# Patient Record
Sex: FEMALE | Race: WHITE | NOT HISPANIC OR LATINO | URBAN - METROPOLITAN AREA
[De-identification: names, ages, dates, MRNs, and addresses within clinical notes are randomized per-mention and may not be internally consistent; named-entity substitution may affect disease eponyms.]

---

## 2017-01-12 ENCOUNTER — IMPORTED ENCOUNTER (OUTPATIENT)
Dept: URBAN - METROPOLITAN AREA CLINIC 50 | Facility: CLINIC | Age: 82
End: 2017-01-12

## 2017-07-11 ENCOUNTER — IMPORTED ENCOUNTER (OUTPATIENT)
Dept: URBAN - METROPOLITAN AREA CLINIC 50 | Facility: CLINIC | Age: 82
End: 2017-07-11

## 2017-07-13 ENCOUNTER — IMPORTED ENCOUNTER (OUTPATIENT)
Dept: URBAN - METROPOLITAN AREA CLINIC 50 | Facility: CLINIC | Age: 82
End: 2017-07-13

## 2017-08-21 ENCOUNTER — IMPORTED ENCOUNTER (OUTPATIENT)
Dept: URBAN - METROPOLITAN AREA CLINIC 50 | Facility: CLINIC | Age: 82
End: 2017-08-21

## 2017-10-02 ENCOUNTER — IMPORTED ENCOUNTER (OUTPATIENT)
Dept: URBAN - METROPOLITAN AREA CLINIC 50 | Facility: CLINIC | Age: 82
End: 2017-10-02

## 2017-10-03 ENCOUNTER — IMPORTED ENCOUNTER (OUTPATIENT)
Dept: URBAN - METROPOLITAN AREA CLINIC 50 | Facility: CLINIC | Age: 82
End: 2017-10-03

## 2017-10-24 ENCOUNTER — IMPORTED ENCOUNTER (OUTPATIENT)
Dept: URBAN - METROPOLITAN AREA CLINIC 50 | Facility: CLINIC | Age: 82
End: 2017-10-24

## 2017-12-05 ENCOUNTER — IMPORTED ENCOUNTER (OUTPATIENT)
Dept: URBAN - METROPOLITAN AREA CLINIC 50 | Facility: CLINIC | Age: 82
End: 2017-12-05

## 2018-05-29 ENCOUNTER — IMPORTED ENCOUNTER (OUTPATIENT)
Dept: URBAN - METROPOLITAN AREA CLINIC 50 | Facility: CLINIC | Age: 83
End: 2018-05-29

## 2018-07-30 ENCOUNTER — IMPORTED ENCOUNTER (OUTPATIENT)
Dept: URBAN - METROPOLITAN AREA CLINIC 50 | Facility: CLINIC | Age: 83
End: 2018-07-30

## 2019-01-28 ENCOUNTER — IMPORTED ENCOUNTER (OUTPATIENT)
Dept: URBAN - METROPOLITAN AREA CLINIC 50 | Facility: CLINIC | Age: 84
End: 2019-01-28

## 2019-07-29 ENCOUNTER — IMPORTED ENCOUNTER (OUTPATIENT)
Dept: URBAN - METROPOLITAN AREA CLINIC 50 | Facility: CLINIC | Age: 84
End: 2019-07-29

## 2019-09-23 ENCOUNTER — IMPORTED ENCOUNTER (OUTPATIENT)
Dept: URBAN - METROPOLITAN AREA CLINIC 50 | Facility: CLINIC | Age: 84
End: 2019-09-23

## 2019-10-23 ENCOUNTER — IMPORTED ENCOUNTER (OUTPATIENT)
Dept: URBAN - METROPOLITAN AREA CLINIC 50 | Facility: CLINIC | Age: 84
End: 2019-10-23

## 2019-10-24 ENCOUNTER — IMPORTED ENCOUNTER (OUTPATIENT)
Dept: URBAN - METROPOLITAN AREA CLINIC 50 | Facility: CLINIC | Age: 84
End: 2019-10-24

## 2020-02-27 ENCOUNTER — IMPORTED ENCOUNTER (OUTPATIENT)
Dept: URBAN - METROPOLITAN AREA CLINIC 50 | Facility: CLINIC | Age: 85
End: 2020-02-27

## 2020-02-28 ENCOUNTER — IMPORTED ENCOUNTER (OUTPATIENT)
Dept: URBAN - METROPOLITAN AREA CLINIC 50 | Facility: CLINIC | Age: 85
End: 2020-02-28

## 2020-06-12 ENCOUNTER — IMPORTED ENCOUNTER (OUTPATIENT)
Dept: URBAN - METROPOLITAN AREA CLINIC 50 | Facility: CLINIC | Age: 85
End: 2020-06-12

## 2020-08-27 ENCOUNTER — IMPORTED ENCOUNTER (OUTPATIENT)
Dept: URBAN - METROPOLITAN AREA CLINIC 50 | Facility: CLINIC | Age: 85
End: 2020-08-27

## 2020-08-27 NOTE — PATIENT DISCUSSION
"""IOP stable OU. Testing reviewed with patient today."" ""Continue Travatan Z both eyes at bedtime . "" ""OCT RNFL: OD: Abnormal

## 2021-02-11 ENCOUNTER — IMPORTED ENCOUNTER (OUTPATIENT)
Dept: URBAN - METROPOLITAN AREA CLINIC 50 | Facility: CLINIC | Age: 86
End: 2021-02-11

## 2021-04-17 ASSESSMENT — VISUAL ACUITY
OD_CC: J1+
OD_SC: 20/20
OD_SC: 20/25+2
OS_PH: 20/30
OS_SC: 20/30-1
OD_SC: 20/20-2
OS_SC: 20/25-
OD_SC: 20/20
OS_CC: 20/25
OS_SC: 20/25-1
OS_SC: 20/30-2
OS_CC: J1+@ 18 IN
OD_SC: 20/25-1
OS_SC: 20/30+2
OD_SC: 20/25
OD_SC: 20/20-1
OS_SC: 20/25-
OD_CC: 20/20
OS_CC: 20/15
OD_SC: 20/25-2
OD_SC: 20/30+2
OS_CC: J1+
OS_SC: 20/30-2
OS_SC: 20/30+1
OD_CC: 20/25
OS_SC: 20/30=
OD_SC: 20/25
OD_CC: J1
OD_SC: 20/25-
OS_SC: 20/25-2
OD_SC: 20/25=
OS_CC: 20/30
OS_SC: 20/30
OS_PH: 20/25-2
OS_CC: J1
OS_SC: 20/25-2
OD_CC: J1+@ 18 IN

## 2021-04-17 ASSESSMENT — TONOMETRY
OS_IOP_MMHG: 15
OS_IOP_MMHG: 19
OS_IOP_MMHG: 21
OD_IOP_MMHG: 17
OS_IOP_MMHG: 17
OS_IOP_MMHG: 17
OS_IOP_MMHG: 14
OD_IOP_MMHG: 16
OS_IOP_MMHG: 20
OD_IOP_MMHG: 16
OD_IOP_MMHG: 15
OS_IOP_MMHG: 15
OS_IOP_MMHG: 18
OS_IOP_MMHG: 16
OD_IOP_MMHG: 16
OD_IOP_MMHG: 20
OS_IOP_MMHG: 14
OD_IOP_MMHG: 16
OD_IOP_MMHG: 15
OS_IOP_MMHG: 21
OD_IOP_MMHG: 18
OS_IOP_MMHG: 20
OS_IOP_MMHG: 20
OD_IOP_MMHG: 16
OS_IOP_MMHG: 18
OS_IOP_MMHG: 19
OD_IOP_MMHG: 17
OS_IOP_MMHG: 18
OD_IOP_MMHG: 20
OS_IOP_MMHG: 18
OD_IOP_MMHG: 18
OS_IOP_MMHG: 17
OS_IOP_MMHG: 17
OD_IOP_MMHG: 17
OD_IOP_MMHG: 21
OS_IOP_MMHG: 18
OD_IOP_MMHG: 19
OD_IOP_MMHG: 14
OS_IOP_MMHG: 21
OD_IOP_MMHG: 19
OS_IOP_MMHG: 15
OS_IOP_MMHG: 18
OD_IOP_MMHG: 21
OS_IOP_MMHG: 16
OS_IOP_MMHG: 17
OD_IOP_MMHG: 18
OD_IOP_MMHG: 19

## 2021-04-17 ASSESSMENT — PACHYMETRY
OS_CT_UM: 605
OD_CT_UM: 586
OS_CT_UM: 605
OD_CT_UM: 586
OS_CT_UM: 605
OD_CT_UM: 586
OS_CT_UM: 605
OD_CT_UM: 586
OS_CT_UM: 605
OD_CT_UM: 586
OS_CT_UM: 605
OD_CT_UM: 586
OD_CT_UM: 586
OS_CT_UM: 605
OD_CT_UM: 586
OS_CT_UM: 605
OD_CT_UM: 586
OS_CT_UM: 605
OS_CT_UM: 605
OD_CT_UM: 586
OD_CT_UM: 586
OS_CT_UM: 605

## 2021-08-18 ENCOUNTER — PREPPED CHART (OUTPATIENT)
Dept: URBAN - METROPOLITAN AREA CLINIC 53 | Facility: CLINIC | Age: 86
End: 2021-08-18

## 2021-10-27 ENCOUNTER — PROBLEM (OUTPATIENT)
Dept: URBAN - METROPOLITAN AREA CLINIC 50 | Facility: CLINIC | Age: 86
End: 2021-10-27

## 2021-10-27 DIAGNOSIS — H01.021: ICD-10-CM

## 2021-10-27 DIAGNOSIS — H01.024: ICD-10-CM

## 2021-10-27 PROCEDURE — 92012 INTRM OPH EXAM EST PATIENT: CPT

## 2021-10-27 RX ORDER — OLOPATADINE HYDROCHLORIDE 2 MG/ML: 1 SOLUTION OPHTHALMIC

## 2021-10-27 RX ORDER — LOTEPREDNOL ETABONATE 5 MG/ML: 1 SUSPENSION/ DROPS OPHTHALMIC TWICE A DAY

## 2021-10-27 ASSESSMENT — VISUAL ACUITY
OS_PH: 20/25-1
OS_SC: 20/40+1
OD_SC: 20/20-1

## 2021-10-27 ASSESSMENT — TONOMETRY
OS_IOP_MMHG: 13
OD_IOP_MMHG: 14
OD_IOP_MMHG: 16
OS_IOP_MMHG: 16

## 2021-11-04 ENCOUNTER — DIAGNOSTICS - HVF/OCT (OUTPATIENT)
Dept: URBAN - METROPOLITAN AREA CLINIC 53 | Facility: CLINIC | Age: 86
End: 2021-11-04

## 2021-11-04 DIAGNOSIS — H40.1131: ICD-10-CM

## 2021-11-04 PROCEDURE — 92133 CPTRZD OPH DX IMG PST SGM ON: CPT

## 2021-11-11 ENCOUNTER — 8 MONTH FOLLOW-UP (OUTPATIENT)
Dept: URBAN - METROPOLITAN AREA CLINIC 53 | Facility: CLINIC | Age: 86
End: 2021-11-11

## 2021-11-11 DIAGNOSIS — H16.223: ICD-10-CM

## 2021-11-11 DIAGNOSIS — H01.024: ICD-10-CM

## 2021-11-11 DIAGNOSIS — H01.021: ICD-10-CM

## 2021-11-11 DIAGNOSIS — H40.1131: ICD-10-CM

## 2021-11-11 PROCEDURE — 92012 INTRM OPH EXAM EST PATIENT: CPT

## 2021-11-11 RX ORDER — LOTEPREDNOL ETABONATE 5 MG/ML
1 SUSPENSION/ DROPS OPHTHALMIC TWICE A DAY
End: 2021-11-11

## 2021-11-11 ASSESSMENT — TONOMETRY
OD_IOP_MMHG: 13
OD_IOP_MMHG: 11
OS_IOP_MMHG: 10
OS_IOP_MMHG: 13

## 2021-11-11 ASSESSMENT — VISUAL ACUITY
OD_SC: 20/20-1
OS_SC: 20/40
OS_PH: 20/25

## 2022-04-20 ENCOUNTER — DIAGNOSTICS ONLY (OUTPATIENT)
Dept: URBAN - METROPOLITAN AREA CLINIC 52 | Facility: CLINIC | Age: 87
End: 2022-04-20

## 2022-04-20 DIAGNOSIS — H47.233: ICD-10-CM

## 2022-04-20 PROCEDURE — 92273 FULL FIELD ERG W/I&R: CPT

## 2022-04-21 ENCOUNTER — ESTABLISHED PATIENT (OUTPATIENT)
Dept: URBAN - METROPOLITAN AREA CLINIC 53 | Facility: CLINIC | Age: 87
End: 2022-04-21

## 2022-04-21 DIAGNOSIS — H47.233: ICD-10-CM

## 2022-04-21 DIAGNOSIS — H16.223: ICD-10-CM

## 2022-04-21 DIAGNOSIS — E11.9: ICD-10-CM

## 2022-04-21 DIAGNOSIS — H40.1131: ICD-10-CM

## 2022-04-21 PROCEDURE — 92083 EXTENDED VISUAL FIELD XM: CPT

## 2022-04-21 PROCEDURE — 92133 CPTRZD OPH DX IMG PST SGM ON: CPT

## 2022-04-21 PROCEDURE — 92014 COMPRE OPH EXAM EST PT 1/>: CPT

## 2022-04-21 ASSESSMENT — VISUAL ACUITY
OS_GLARE: 20/50
OD_CC: J1+(-1) @16IN
OD_GLARE: 20/40
OD_SC: 20/20-2
OD_GLARE: 20/25
OS_SC: 20/25+2
OU_CC: J1+ @16IN
OS_CC: J1+ @16IN
OS_GLARE: 20/30

## 2022-04-21 ASSESSMENT — TONOMETRY
OS_IOP_MMHG: 10
OD_IOP_MMHG: 13
OD_IOP_MMHG: 11
OS_IOP_MMHG: 13

## 2022-10-20 ENCOUNTER — FOLLOW UP (OUTPATIENT)
Dept: URBAN - METROPOLITAN AREA CLINIC 53 | Facility: CLINIC | Age: 87
End: 2022-10-20

## 2022-10-20 DIAGNOSIS — H40.1131: ICD-10-CM

## 2022-10-20 DIAGNOSIS — H16.223: ICD-10-CM

## 2022-10-20 PROCEDURE — 92133 CPTRZD OPH DX IMG PST SGM ON: CPT

## 2022-10-20 PROCEDURE — 92012 INTRM OPH EXAM EST PATIENT: CPT

## 2022-10-20 ASSESSMENT — VISUAL ACUITY
OS_SC: 20/25-2
OD_SC: 20/20
OU_SC: 20/20

## 2022-10-20 ASSESSMENT — TONOMETRY
OD_IOP_MMHG: 13
OD_IOP_MMHG: 11
OS_IOP_MMHG: 14
OS_IOP_MMHG: 11

## 2023-04-07 ENCOUNTER — COMPREHENSIVE EXAM (OUTPATIENT)
Dept: URBAN - METROPOLITAN AREA CLINIC 53 | Facility: CLINIC | Age: 88
End: 2023-04-07

## 2023-04-07 DIAGNOSIS — H40.1131: ICD-10-CM

## 2023-04-07 DIAGNOSIS — H16.223: ICD-10-CM

## 2023-04-07 DIAGNOSIS — H43.813: ICD-10-CM

## 2023-04-07 DIAGNOSIS — E11.9: ICD-10-CM

## 2023-04-07 PROCEDURE — 92014 COMPRE OPH EXAM EST PT 1/>: CPT

## 2023-04-07 PROCEDURE — 92133 CPTRZD OPH DX IMG PST SGM ON: CPT

## 2023-04-07 PROCEDURE — 92083 EXTENDED VISUAL FIELD XM: CPT

## 2023-04-07 ASSESSMENT — TONOMETRY
OS_IOP_MMHG: 14
OD_IOP_MMHG: 14
OD_IOP_MMHG: 16
OS_IOP_MMHG: 17

## 2023-04-07 ASSESSMENT — VISUAL ACUITY
OD_GLARE: 20/25
OD_SC: 20/25-2
OS_PH: 20/25
OD_GLARE: 20/25
OU_SC: 20/25
OS_SC: 20/30
OU_SC: J1-1@16"
OS_GLARE: 20/20
OS_GLARE: 20/20

## 2023-08-01 ENCOUNTER — ESTABLISHED PATIENT (OUTPATIENT)
Dept: URBAN - METROPOLITAN AREA CLINIC 53 | Facility: CLINIC | Age: 88
End: 2023-08-01

## 2023-08-01 DIAGNOSIS — H16.223: ICD-10-CM

## 2023-08-01 DIAGNOSIS — H10.13: ICD-10-CM

## 2023-08-01 PROCEDURE — 92012 INTRM OPH EXAM EST PATIENT: CPT

## 2023-08-01 ASSESSMENT — TONOMETRY
OS_IOP_MMHG: 15
OD_IOP_MMHG: 16
OS_IOP_MMHG: 12
OD_IOP_MMHG: 14

## 2023-08-01 ASSESSMENT — VISUAL ACUITY
OS_PH: 20/20
OS_SC: 20/30
OD_SC: 20/25

## 2023-08-23 ENCOUNTER — ESTABLISHED PATIENT (OUTPATIENT)
Dept: URBAN - METROPOLITAN AREA CLINIC 53 | Facility: CLINIC | Age: 88
End: 2023-08-23

## 2023-08-23 DIAGNOSIS — H16.223: ICD-10-CM

## 2023-08-23 DIAGNOSIS — H10.13: ICD-10-CM

## 2023-08-23 PROCEDURE — 92012 INTRM OPH EXAM EST PATIENT: CPT

## 2023-08-23 RX ORDER — LOTEPREDNOL ETABONATE 2.5 MG/ML: 1 SUSPENSION/ DROPS OPHTHALMIC TWICE A DAY

## 2023-08-23 ASSESSMENT — VISUAL ACUITY
OD_SC: 20/25-1
OU_SC: 20/25
OS_SC: 20/40-1
OS_PH: 20/25

## 2023-08-23 ASSESSMENT — TONOMETRY
OS_IOP_MMHG: 14
OD_IOP_MMHG: 15
OD_IOP_MMHG: 17
OS_IOP_MMHG: 17

## 2023-09-06 ENCOUNTER — FOLLOW UP (OUTPATIENT)
Dept: URBAN - METROPOLITAN AREA CLINIC 53 | Facility: CLINIC | Age: 88
End: 2023-09-06

## 2023-09-06 DIAGNOSIS — H16.223: ICD-10-CM

## 2023-09-06 DIAGNOSIS — H10.13: ICD-10-CM

## 2023-09-06 PROCEDURE — 92012 INTRM OPH EXAM EST PATIENT: CPT

## 2023-09-06 ASSESSMENT — VISUAL ACUITY
OS_PH: 20/20
OU_SC: 20/20
OS_SC: 20/30-1
OD_SC: 20/20-1

## 2023-09-06 ASSESSMENT — TONOMETRY
OD_IOP_MMHG: 18
OD_IOP_MMHG: 16
OS_IOP_MMHG: 17
OS_IOP_MMHG: 14

## 2023-10-06 ENCOUNTER — FOLLOW UP (OUTPATIENT)
Dept: URBAN - METROPOLITAN AREA CLINIC 53 | Facility: CLINIC | Age: 88
End: 2023-10-06

## 2023-10-06 DIAGNOSIS — H40.1131: ICD-10-CM

## 2023-10-06 PROCEDURE — 92133 CPTRZD OPH DX IMG PST SGM ON: CPT

## 2023-10-06 PROCEDURE — 99213 OFFICE O/P EST LOW 20 MIN: CPT

## 2023-10-06 ASSESSMENT — VISUAL ACUITY
OD_SC: 20/20-2
OS_PH: 20/25
OS_SC: 20/30-1
OU_SC: 20/20-2

## 2023-10-06 ASSESSMENT — TONOMETRY
OD_IOP_MMHG: 18
OS_IOP_MMHG: 16
OD_IOP_MMHG: 16
OS_IOP_MMHG: 19

## 2024-04-12 ENCOUNTER — FOLLOW UP (OUTPATIENT)
Dept: URBAN - METROPOLITAN AREA CLINIC 53 | Facility: CLINIC | Age: 89
End: 2024-04-12

## 2024-04-12 DIAGNOSIS — H40.1131: ICD-10-CM

## 2024-04-12 PROCEDURE — 92133 CPTRZD OPH DX IMG PST SGM ON: CPT

## 2024-04-12 PROCEDURE — 99213 OFFICE O/P EST LOW 20 MIN: CPT

## 2024-04-12 ASSESSMENT — TONOMETRY
OD_IOP_MMHG: 15
OD_IOP_MMHG: 17
OS_IOP_MMHG: 14
OS_IOP_MMHG: 17

## 2024-04-12 ASSESSMENT — VISUAL ACUITY
OU_SC: 20/20-2
OS_SC: 20/30
OD_SC: 20/20-2

## 2024-05-09 ENCOUNTER — CLINIC PROCEDURE ONLY (OUTPATIENT)
Dept: URBAN - METROPOLITAN AREA CLINIC 53 | Facility: CLINIC | Age: 89
End: 2024-05-09

## 2024-05-09 DIAGNOSIS — H40.1131: ICD-10-CM

## 2024-05-09 PROCEDURE — 65855 TRABECULOPLASTY LASER SURG: CPT | Mod: 25,LT

## 2024-05-09 RX ORDER — FLUOROMETHOLONE 1 MG/ML
1 SOLUTION/ DROPS OPHTHALMIC
Start: 2024-05-09

## 2024-05-09 ASSESSMENT — TONOMETRY
OS_IOP_MMHG: 11
OS_IOP_MMHG: 17
OD_IOP_MMHG: 14
OD_IOP_MMHG: 12
OS_IOP_MMHG: 14
OS_IOP_MMHG: 14

## 2024-05-09 ASSESSMENT — VISUAL ACUITY
OS_SC: 20/25
OD_SC: 20/20-2

## 2024-05-16 ENCOUNTER — CLINIC PROCEDURE ONLY (OUTPATIENT)
Dept: URBAN - METROPOLITAN AREA CLINIC 53 | Facility: CLINIC | Age: 89
End: 2024-05-16

## 2024-05-16 DIAGNOSIS — H40.1111: ICD-10-CM

## 2024-05-16 PROCEDURE — 65855 TRABECULOPLASTY LASER SURG: CPT

## 2024-05-16 ASSESSMENT — VISUAL ACUITY
OU_SC: 20/20
OD_SC: 20/20-1
OS_SC: 20/30+2

## 2024-05-16 ASSESSMENT — TONOMETRY
OS_IOP_MMHG: 16
OD_IOP_MMHG: 15
OD_IOP_MMHG: 14
OS_IOP_MMHG: 13
OD_IOP_MMHG: 13
OD_IOP_MMHG: 16

## 2024-06-13 ENCOUNTER — POST-OP (OUTPATIENT)
Dept: URBAN - METROPOLITAN AREA CLINIC 53 | Facility: CLINIC | Age: 89
End: 2024-06-13

## 2024-06-13 DIAGNOSIS — H40.1131: ICD-10-CM

## 2024-06-13 PROCEDURE — 66999PO NON CO-MANAGED OTHER SURGERY PO

## 2024-06-13 ASSESSMENT — TONOMETRY
OS_IOP_MMHG: 12
OD_IOP_MMHG: 15
OS_IOP_MMHG: 15
OD_IOP_MMHG: 13

## 2024-06-13 ASSESSMENT — VISUAL ACUITY
OS_PH: 20/25
OU_SC: 20/25
OD_SC: 20/25
OS_SC: 20/40

## 2024-08-02 ENCOUNTER — EMERGENCY VISIT (OUTPATIENT)
Dept: URBAN - METROPOLITAN AREA CLINIC 53 | Facility: CLINIC | Age: 89
End: 2024-08-02

## 2024-08-02 DIAGNOSIS — H01.00A: ICD-10-CM

## 2024-08-02 DIAGNOSIS — H02.883: ICD-10-CM

## 2024-08-02 DIAGNOSIS — H40.1131: ICD-10-CM

## 2024-08-02 PROCEDURE — 99214 OFFICE O/P EST MOD 30 MIN: CPT

## 2024-08-02 RX ORDER — ERYTHROMYCIN 5 MG/G: OINTMENT OPHTHALMIC EVERY EVENING

## 2024-08-02 ASSESSMENT — VISUAL ACUITY
OS_SC: 20/30+2
OD_SC: 20/20-2

## 2024-08-02 ASSESSMENT — TONOMETRY
OS_IOP_MMHG: 14
OD_IOP_MMHG: 15

## 2024-08-09 ENCOUNTER — EMERGENCY VISIT (OUTPATIENT)
Dept: URBAN - METROPOLITAN AREA CLINIC 53 | Facility: CLINIC | Age: 89
End: 2024-08-09

## 2024-08-09 DIAGNOSIS — H40.1131: ICD-10-CM

## 2024-08-09 DIAGNOSIS — H02.883: ICD-10-CM

## 2024-08-09 DIAGNOSIS — H02.886: ICD-10-CM

## 2024-08-09 PROCEDURE — 99213 OFFICE O/P EST LOW 20 MIN: CPT

## 2024-08-09 RX ORDER — CEPHALEXIN 500 MG/1
1 CAPSULE ORAL TWICE A DAY
Start: 2024-08-09

## 2024-08-09 ASSESSMENT — VISUAL ACUITY
OD_SC: 20/25
OS_SC: 20/25

## 2024-08-09 ASSESSMENT — TONOMETRY
OS_IOP_MMHG: 14
OD_IOP_MMHG: 13
OD_IOP_MMHG: 15
OS_IOP_MMHG: 11

## 2024-09-03 ENCOUNTER — EMERGENCY VISIT (OUTPATIENT)
Dept: URBAN - METROPOLITAN AREA CLINIC 53 | Facility: CLINIC | Age: 89
End: 2024-09-03

## 2024-09-03 DIAGNOSIS — H02.883: ICD-10-CM

## 2024-09-03 DIAGNOSIS — H02.886: ICD-10-CM

## 2024-09-03 PROCEDURE — 99213 OFFICE O/P EST LOW 20 MIN: CPT

## 2024-09-03 RX ORDER — FLUOROMETHOLONE 1 MG/ML: 1 SUSPENSION/ DROPS OPHTHALMIC

## 2024-12-13 ENCOUNTER — COMPREHENSIVE EXAM (OUTPATIENT)
Age: 89
End: 2024-12-13

## 2024-12-13 DIAGNOSIS — H40.1131: ICD-10-CM

## 2024-12-13 DIAGNOSIS — H43.813: ICD-10-CM

## 2024-12-13 DIAGNOSIS — H16.223: ICD-10-CM

## 2024-12-13 DIAGNOSIS — H52.4: ICD-10-CM

## 2024-12-13 DIAGNOSIS — E11.9: ICD-10-CM

## 2024-12-13 PROCEDURE — 99214 OFFICE O/P EST MOD 30 MIN: CPT

## 2024-12-13 PROCEDURE — 92133 CPTRZD OPH DX IMG PST SGM ON: CPT

## 2025-08-06 ENCOUNTER — FOLLOW UP (OUTPATIENT)
Age: 89
End: 2025-08-06

## 2025-08-06 DIAGNOSIS — H16.223: ICD-10-CM

## 2025-08-06 DIAGNOSIS — H40.1131: ICD-10-CM

## 2025-08-06 PROCEDURE — 92133 CPTRZD OPH DX IMG PST SGM ON: CPT

## 2025-08-06 PROCEDURE — 99213 OFFICE O/P EST LOW 20 MIN: CPT
